# Patient Record
Sex: FEMALE | Race: WHITE
[De-identification: names, ages, dates, MRNs, and addresses within clinical notes are randomized per-mention and may not be internally consistent; named-entity substitution may affect disease eponyms.]

---

## 2017-04-22 ENCOUNTER — HOSPITAL ENCOUNTER (EMERGENCY)
Dept: HOSPITAL 25 - UCCORT | Age: 22
Discharge: HOME | End: 2017-04-22
Payer: COMMERCIAL

## 2017-04-22 VITALS — SYSTOLIC BLOOD PRESSURE: 118 MMHG | DIASTOLIC BLOOD PRESSURE: 67 MMHG

## 2017-04-22 DIAGNOSIS — J06.9: ICD-10-CM

## 2017-04-22 DIAGNOSIS — F12.90: ICD-10-CM

## 2017-04-22 DIAGNOSIS — H10.33: Primary | ICD-10-CM

## 2017-04-22 PROCEDURE — G0463 HOSPITAL OUTPT CLINIC VISIT: HCPCS

## 2017-04-22 PROCEDURE — 99202 OFFICE O/P NEW SF 15 MIN: CPT

## 2017-04-22 NOTE — UC
Eye Complaint HPI





- HPI Summary


HPI Summary: 


BILATERAL EYE REDNESS AND SWELLING, ITCHING, DRAINAGE. ONSET YESTERDAY. PLT 

DOES NOT WEAR CONTACTS. NO INJURY TO EYES. HAS HAD SINUS CONGESTION AND RUNNY 

NOSE FOR A WEEK.  She had sinus pressure/tooth pain last week but that has 

resoloved. she had gone to [facility] and was not prescribed abx. denies 

trauma or visual changes. 


Denies chance of pregnancy








- History of Current Complaint


Chief Complaint: UCEye


Stated Complaint: EYE COMPLAINT


Time Seen by Provider: 04/22/17 07:27


Hx Last Menstrual Period: 4/10





- Allergies/Home Medications


Allergies/Adverse Reactions: 


 Allergies











Allergy/AdvReac Type Severity Reaction Status Date / Time


 


No Known Allergies Allergy   Verified 04/22/17 07:16











Home Medications: 


 Home Medications





ALPRAZolam TAB* [Xanax TAB*] 0.25 mg PO Q6H PRN 04/22/17 [History Confirmed 04/ 22/17]


Norgestimate-Ethinyl Estradiol [Sprintec 28 0.25-35 mg-Mcg] 1 tab PO DAILY 04/22 /17 [History Confirmed 04/22/17]











PMH/Surg Hx/FS Hx/Imm Hx


Previously Healthy: Yes





- Surgical History


Surgical History: None





- Family History


Known Family History: Positive: Diabetes





- Social History


Alcohol Use: Rare


Substance Use Type: Marijuana


Smoking Status (MU): Never Smoked Tobacco





Review of Systems


Constitutional: Negative


Skin: Negative


Eyes: Drainage, Eye Redness


ENT: Nasal Discharge


Respiratory: Negative


Cardiovascular: Negative


Gastrointestinal: Negative


Genitourinary: Negative


Motor: Negative


Neurovascular: Negative


Musculoskeletal: Negative


Neurological: Negative


Psychological: Negative


All Other Systems Reviewed And Are Negative: Yes





Physical Exam


Triage Information Reviewed: Yes


Appearance: Well-Appearing, Ill-Appearing - mild


Vital Signs: 


 Initial Vital Signs











Temp  98.7 F   04/22/17 07:17


 


Pulse  87   04/22/17 07:17


 


Resp  18   04/22/17 07:17


 


BP  118/67   04/22/17 07:17


 


Pulse Ox  100   04/22/17 07:17











Vital Signs Reviewed: Yes


Eyes: Positive: Discharge - mild crusting d/c. mild conjunctival injection.


ENT: Positive: Hearing grossly normal, TMs normal, Other: - no sinus tenderness 

frontal or maxillary tenderness. OP + PND.  Negative: Tonsillar swelling, 

Tonsillar exudate


Dental Exam: Normal


Neck exam: Normal


Neck: Positive: Supple, Nontender, No Lymphadenopathy.  Negative: Nuchal 

Rigidity


Respiratory: Positive: Lungs clear, Normal breath sounds, No respiratory 

distress, No accessory muscle use.  Negative: Crackles, Rhonchi, Stridor, 

Wheezing


Cardiovascular Exam: Normal


Cardiovascular: Positive: RRR, No Murmur, Pulses Normal, Brisk Capillary Refill


Abdominal Exam: Normal


Abdomen Description: Positive: Nontender, Soft


Musculoskeletal Exam: Normal


Neurological Exam: Normal


Psychological Exam: Normal


Skin Exam: Normal





Eye Complaint Course/Dx





- Differential Dx/Diagnosis


Differential Diagnosis/HQI/PQRI: Conjunctivitis, Other


Provider Diagnoses: b/l conjunctivitis, Viral URI





Discharge





- Discharge Plan


Condition: Stable


Disposition: HOME


Prescriptions: 


Gentamicin 0.3% OPHTH.SOLN* 1 drop BOTH EYES Q4H #1 btl


Patient Education Materials:  Conjunctivitis (ED), Cold Symptoms (ED)


Referrals: 


Non Staff,Doctor [Primary Care Provider] - 


Additional Instructions: 


Follow up at [facility] in 3-4 days. Cool compresses. If your sinus pain or 

symptoms worsen, then an antibiotic may be indicated.

## 2022-05-16 ENCOUNTER — APPOINTMENT (OUTPATIENT)
Dept: OBGYN | Facility: CLINIC | Age: 27
End: 2022-05-16
Payer: COMMERCIAL

## 2022-05-16 ENCOUNTER — LABORATORY RESULT (OUTPATIENT)
Age: 27
End: 2022-05-16

## 2022-05-16 ENCOUNTER — NON-APPOINTMENT (OUTPATIENT)
Age: 27
End: 2022-05-16

## 2022-05-16 VITALS
HEART RATE: 77 BPM | DIASTOLIC BLOOD PRESSURE: 77 MMHG | SYSTOLIC BLOOD PRESSURE: 112 MMHG | HEIGHT: 67 IN | WEIGHT: 120 LBS | BODY MASS INDEX: 18.83 KG/M2

## 2022-05-16 DIAGNOSIS — Z01.419 ENCOUNTER FOR GYNECOLOGICAL EXAMINATION (GENERAL) (ROUTINE) W/OUT ABNORMAL FINDINGS: ICD-10-CM

## 2022-05-16 DIAGNOSIS — Z00.00 ENCOUNTER FOR GENERAL ADULT MEDICAL EXAMINATION W/OUT ABNORMAL FINDINGS: ICD-10-CM

## 2022-05-16 DIAGNOSIS — Z22.39 CARRIER OF OTHER SPECIFIED BACTERIAL DISEASES: ICD-10-CM

## 2022-05-16 DIAGNOSIS — N39.0 URINARY TRACT INFECTION, SITE NOT SPECIFIED: ICD-10-CM

## 2022-05-16 DIAGNOSIS — Z11.3 ENCOUNTER FOR SCREENING FOR INFECTIONS WITH A PREDOMINANTLY SEXUAL MODE OF TRANSMISSION: ICD-10-CM

## 2022-05-16 PROCEDURE — 99214 OFFICE O/P EST MOD 30 MIN: CPT | Mod: 25

## 2022-05-16 PROCEDURE — 99385 PREV VISIT NEW AGE 18-39: CPT

## 2022-05-16 RX ORDER — DOXYCYCLINE HYCLATE 100 MG/1
100 TABLET ORAL
Qty: 28 | Refills: 0 | Status: ACTIVE | COMMUNITY
Start: 2022-05-16 | End: 1900-01-01

## 2022-05-16 RX ORDER — FLUCONAZOLE 150 MG/1
150 TABLET ORAL
Qty: 6 | Refills: 1 | Status: ACTIVE | COMMUNITY
Start: 2022-05-16 | End: 1900-01-01

## 2022-05-16 RX ORDER — VALACYCLOVIR 500 MG/1
500 TABLET, FILM COATED ORAL TWICE DAILY
Qty: 28 | Refills: 0 | Status: ACTIVE | COMMUNITY
Start: 2022-05-16 | End: 1900-01-01

## 2022-05-18 NOTE — DISCUSSION/SUMMARY
[FreeTextEntry1] : dw pt approach to treating ureaplama and mycoplasma, doxy erxd for pt, emergency rx written for partner.. cultures sent. dw pt est for recurrent utis.\par spent 35 min in consultation.

## 2022-05-18 NOTE — PHYSICAL EXAM
[Appropriately responsive] : appropriately responsive [Alert] : alert [No Acute Distress] : no acute distress [No Lymphadenopathy] : no lymphadenopathy [Regular Rate Rhythm] : regular rate rhythm [No Murmurs] : no murmurs [Clear to Auscultation B/L] : clear to auscultation bilaterally [Soft] : soft [Non-tender] : non-tender [Non-distended] : non-distended [No HSM] : No HSM [No Lesions] : no lesions [No Mass] : no mass [Oriented x3] : oriented x3 [Examination Of The Breasts] : a normal appearance [No Masses] : no breast masses were palpable [Labia Majora] : normal [Labia Minora] : normal [Discharge] : a  ~M vaginal discharge was present [Moderate] : moderate [Clear] : clear [Curds] : curd-like [Normal] : normal [Uterine Adnexae] : normal [Foul Smelling] : not foul smelling

## 2022-05-18 NOTE — HISTORY OF PRESENT ILLNESS
[FreeTextEntry1] : 25 yo pt here for evaluation. having freq utis,recurrent  bv, recurrent yeast infections, abnl paps with hpv negative.  had d and c for poss polyps, none seen in hysteroscopy. \par has hsv1 , had a genital outbreak of hsv1.\par had culture + for ureaplasma and mycoplasma\par stopped ocps 2 months ago.\par

## 2022-05-18 NOTE — REVIEW OF SYSTEMS
[Genital Rash/Irritation] : genital rash/irritation [Negative] : Heme/Lymph [Urgency] : urgency [Frequency] : frequency [Dysuria] : dysuria

## 2022-05-19 DIAGNOSIS — B96.89 ACUTE VAGINITIS: ICD-10-CM

## 2022-05-19 DIAGNOSIS — N76.0 ACUTE VAGINITIS: ICD-10-CM

## 2022-05-19 LAB
A VAGINAE DNA VAG QL NAA+PROBE: ABNORMAL
BVAB2 DNA VAG QL NAA+PROBE: NORMAL
C KRUSEI DNA VAG QL NAA+PROBE: NEGATIVE
C KRUSEI DNA VAG QL NAA+PROBE: POSITIVE
C TRACH RRNA SPEC QL NAA+PROBE: NEGATIVE
C TRACH RRNA SPEC QL NAA+PROBE: NOT DETECTED
MEGA1 DNA VAG QL NAA+PROBE: NORMAL
N GONORRHOEA RRNA SPEC QL NAA+PROBE: NEGATIVE
N GONORRHOEA RRNA SPEC QL NAA+PROBE: NOT DETECTED
SOURCE TP AMPLIFICATION: NORMAL
T VAGINALIS RRNA SPEC QL NAA+PROBE: NEGATIVE

## 2022-05-19 RX ORDER — METRONIDAZOLE 7.5 MG/G
0.75 GEL VAGINAL
Qty: 1 | Refills: 0 | Status: ACTIVE | COMMUNITY
Start: 2022-05-19 | End: 1900-01-01

## 2022-05-20 LAB — CYTOLOGY CVX/VAG DOC THIN PREP: ABNORMAL

## 2022-06-09 ENCOUNTER — APPOINTMENT (OUTPATIENT)
Dept: OBGYN | Facility: CLINIC | Age: 27
End: 2022-06-09
Payer: COMMERCIAL

## 2022-06-09 VITALS
SYSTOLIC BLOOD PRESSURE: 110 MMHG | WEIGHT: 117 LBS | DIASTOLIC BLOOD PRESSURE: 68 MMHG | BODY MASS INDEX: 18.36 KG/M2 | HEIGHT: 67 IN

## 2022-06-09 DIAGNOSIS — N76.0 ACUTE VAGINITIS: ICD-10-CM

## 2022-06-09 PROCEDURE — 99213 OFFICE O/P EST LOW 20 MIN: CPT

## 2022-06-09 NOTE — HISTORY OF PRESENT ILLNESS
[FreeTextEntry1] : pt w mixed vaginitis, txd for mycoplasma, yeast and bv. \par feeling better. periods still irregular. started probiotics.

## 2022-06-13 LAB
HSV+VZV DNA SPEC QL NAA+PROBE: NOT DETECTED
SPECIMEN SOURCE: NORMAL

## 2022-06-21 ENCOUNTER — APPOINTMENT (OUTPATIENT)
Dept: OBGYN | Facility: CLINIC | Age: 27
End: 2022-06-21

## 2023-09-05 ENCOUNTER — APPOINTMENT (OUTPATIENT)
Dept: OBGYN | Facility: CLINIC | Age: 28
End: 2023-09-05

## 2024-02-15 ENCOUNTER — RESULT REVIEW (OUTPATIENT)
Age: 29
End: 2024-02-15

## 2024-02-17 ENCOUNTER — TRANSCRIPTION ENCOUNTER (OUTPATIENT)
Age: 29
End: 2024-02-17

## 2024-02-19 ENCOUNTER — TRANSCRIPTION ENCOUNTER (OUTPATIENT)
Age: 29
End: 2024-02-19

## 2024-02-20 ENCOUNTER — TRANSCRIPTION ENCOUNTER (OUTPATIENT)
Age: 29
End: 2024-02-20